# Patient Record
Sex: MALE | Race: WHITE | NOT HISPANIC OR LATINO | Employment: UNEMPLOYED | URBAN - METROPOLITAN AREA
[De-identification: names, ages, dates, MRNs, and addresses within clinical notes are randomized per-mention and may not be internally consistent; named-entity substitution may affect disease eponyms.]

---

## 2024-01-09 ENCOUNTER — APPOINTMENT (OUTPATIENT)
Dept: PRIMARY CARE | Facility: CLINIC | Age: 24
End: 2024-01-09
Payer: MEDICAID

## 2025-05-12 ENCOUNTER — APPOINTMENT (OUTPATIENT)
Dept: PRIMARY CARE | Facility: CLINIC | Age: 25
End: 2025-05-12
Payer: MEDICAID

## 2025-06-09 ENCOUNTER — APPOINTMENT (OUTPATIENT)
Dept: PRIMARY CARE | Facility: CLINIC | Age: 25
End: 2025-06-09
Payer: COMMERCIAL

## 2025-06-23 ENCOUNTER — APPOINTMENT (OUTPATIENT)
Dept: PRIMARY CARE | Facility: CLINIC | Age: 25
End: 2025-06-23
Payer: COMMERCIAL

## 2025-07-07 ENCOUNTER — OFFICE VISIT (OUTPATIENT)
Dept: PRIMARY CARE | Facility: CLINIC | Age: 25
End: 2025-07-07
Payer: COMMERCIAL

## 2025-07-07 ENCOUNTER — CLINICAL SUPPORT (OUTPATIENT)
Dept: PRIMARY CARE | Facility: CLINIC | Age: 25
End: 2025-07-07
Payer: COMMERCIAL

## 2025-07-07 VITALS
BODY MASS INDEX: 42.66 KG/M2 | DIASTOLIC BLOOD PRESSURE: 82 MMHG | OXYGEN SATURATION: 98 % | WEIGHT: 315 LBS | HEART RATE: 82 BPM | TEMPERATURE: 96.9 F | SYSTOLIC BLOOD PRESSURE: 142 MMHG | RESPIRATION RATE: 18 BRPM | HEIGHT: 72 IN

## 2025-07-07 DIAGNOSIS — E66.9 OBESITY, UNSPECIFIED CLASS, UNSPECIFIED OBESITY TYPE, UNSPECIFIED WHETHER SERIOUS COMORBIDITY PRESENT: Primary | ICD-10-CM

## 2025-07-07 DIAGNOSIS — E66.9 OBESITY, UNSPECIFIED CLASS, UNSPECIFIED OBESITY TYPE, UNSPECIFIED WHETHER SERIOUS COMORBIDITY PRESENT: ICD-10-CM

## 2025-07-07 DIAGNOSIS — I10 PRIMARY HYPERTENSION: ICD-10-CM

## 2025-07-07 DIAGNOSIS — F20.0 PARANOID SCHIZOPHRENIA (MULTI): Chronic | ICD-10-CM

## 2025-07-07 DIAGNOSIS — F41.9 ANXIETY: ICD-10-CM

## 2025-07-07 PROBLEM — F32.A DEPRESSION: Status: ACTIVE | Noted: 2025-07-07

## 2025-07-07 PROBLEM — F43.10 POST TRAUMATIC STRESS DISORDER: Chronic | Status: ACTIVE | Noted: 2022-10-17

## 2025-07-07 PROBLEM — F95.9 TIC DISORDER, UNSPECIFIED: Chronic | Status: ACTIVE | Noted: 2022-10-17

## 2025-07-07 PROCEDURE — RXMED WILLOW AMBULATORY MEDICATION CHARGE

## 2025-07-07 PROCEDURE — 3008F BODY MASS INDEX DOCD: CPT | Performed by: FAMILY MEDICINE

## 2025-07-07 PROCEDURE — 3079F DIAST BP 80-89 MM HG: CPT | Performed by: FAMILY MEDICINE

## 2025-07-07 PROCEDURE — 3077F SYST BP >= 140 MM HG: CPT | Performed by: FAMILY MEDICINE

## 2025-07-07 PROCEDURE — 1036F TOBACCO NON-USER: CPT | Performed by: FAMILY MEDICINE

## 2025-07-07 PROCEDURE — 99213 OFFICE O/P EST LOW 20 MIN: CPT | Performed by: FAMILY MEDICINE

## 2025-07-07 RX ORDER — PRAZOSIN HYDROCHLORIDE 1 MG/1
1 CAPSULE ORAL NIGHTLY
COMMUNITY
Start: 2025-01-06 | End: 2025-07-07 | Stop reason: SDUPTHER

## 2025-07-07 RX ORDER — PRAZOSIN HYDROCHLORIDE 1 MG/1
1 CAPSULE ORAL NIGHTLY
Qty: 30 CAPSULE | Refills: 11 | Status: SHIPPED | OUTPATIENT
Start: 2025-07-07 | End: 2026-07-07

## 2025-07-07 RX ORDER — ARIPIPRAZOLE 15 MG/1
15 TABLET ORAL
COMMUNITY
Start: 2025-01-06 | End: 2025-07-07 | Stop reason: SDUPTHER

## 2025-07-07 RX ORDER — QUETIAPINE FUMARATE 25 MG/1
25 TABLET, FILM COATED ORAL 2 TIMES DAILY
Qty: 60 TABLET | Refills: 11 | Status: SHIPPED | OUTPATIENT
Start: 2025-07-07 | End: 2026-07-07

## 2025-07-07 RX ORDER — ARIPIPRAZOLE 15 MG/1
15 TABLET ORAL
Qty: 30 TABLET | Refills: 11 | Status: SHIPPED | OUTPATIENT
Start: 2025-07-07 | End: 2026-07-07

## 2025-07-07 RX ORDER — QUETIAPINE FUMARATE 25 MG/1
25 TABLET, FILM COATED ORAL 2 TIMES DAILY PRN
COMMUNITY
Start: 2025-01-06 | End: 2025-07-07 | Stop reason: SDUPTHER

## 2025-07-07 RX ORDER — TIRZEPATIDE 2.5 MG/.5ML
2.5 INJECTION, SOLUTION SUBCUTANEOUS
Qty: 2 ML | Refills: 0 | Status: SHIPPED | OUTPATIENT
Start: 2025-07-07

## 2025-07-07 ASSESSMENT — PATIENT HEALTH QUESTIONNAIRE - PHQ9
2. FEELING DOWN, DEPRESSED OR HOPELESS: NOT AT ALL
SUM OF ALL RESPONSES TO PHQ9 QUESTIONS 1 AND 2: 0
1. LITTLE INTEREST OR PLEASURE IN DOING THINGS: NOT AT ALL

## 2025-07-07 ASSESSMENT — PROMIS GLOBAL HEALTH SCALE
RATE_AVERAGE_FATIGUE: SEVERE
RATE_SOCIAL_SATISFACTION: POOR
RATE_GENERAL_HEALTH: FAIR
EMOTIONAL_PROBLEMS: ALWAYS
RATE_QUALITY_OF_LIFE: FAIR
RATE_PHYSICAL_HEALTH: FAIR
CARRYOUT_PHYSICAL_ACTIVITIES: MODERATELY
CARRYOUT_SOCIAL_ACTIVITIES: POOR
RATE_AVERAGE_PAIN: 5
RATE_MENTAL_HEALTH: POOR

## 2025-07-07 ASSESSMENT — PAIN SCALES - GENERAL: PAINLEVEL_OUTOF10: 0-NO PAIN

## 2025-07-07 NOTE — PROGRESS NOTES
Subjective   Patient ID: Jefferson Greene is a 24 y.o. male who presents for Annual Exam.    HPI he is not fasting     Review of Systems    Objective   /82   Pulse 82   Temp 36.1 °C (96.9 °F)   Resp 18   Ht 1.829 m (6')   Wt (!) 177 kg (390 lb 12.8 oz)   SpO2 98%   BMI 53.00 kg/m²     Physical Exam    Assessment/Plan   Problem List Items Addressed This Visit           ICD-10-CM    Anxiety F41.9     Other Visit Diagnoses         Codes      Obesity, unspecified class, unspecified obesity type, unspecified whether serious comorbidity present    -  Primary E66.9

## 2025-07-07 NOTE — PROGRESS NOTES
Patient received the following medication education on Zepbound:    - Counseled patient on MOA, expectations, side effects, duration of therapy, contraindications, administration, and monitoring parameters.  - Provided detailed dosing and administration counseling to ensure proper technique.   - Reviewed Zepbound titration schedule, starting with 2.5 mg once weekly to 5mg starting on the 5th week and up to 15mg if tolerated. Patient verbalized understanding  - Counseled patient on the benefits of GLP-1ra glycemic control and weight loss  - Reviewed storage requirements of Zepbound when not in use, and when to administer the medication if a dose is missed.  - Advised patient that they may experience improved satiety after meals and portion sizes of meals may be reduced as doses of Zepbound increase.    Medication education provided by SASHA Perez, PharmD, BCPS

## 2025-07-10 ENCOUNTER — PHARMACY VISIT (OUTPATIENT)
Dept: PHARMACY | Facility: CLINIC | Age: 25
End: 2025-07-10
Payer: COMMERCIAL

## 2025-07-10 PROCEDURE — RXOTC WILLOW AMBULATORY OTC CHARGE

## 2025-07-21 DIAGNOSIS — F20.0 PARANOID SCHIZOPHRENIA (MULTI): Chronic | ICD-10-CM

## 2025-07-21 DIAGNOSIS — I10 PRIMARY HYPERTENSION: ICD-10-CM

## 2025-07-21 RX ORDER — QUETIAPINE FUMARATE 25 MG/1
25 TABLET, FILM COATED ORAL 2 TIMES DAILY
Qty: 180 TABLET | Refills: 3 | Status: SHIPPED | OUTPATIENT
Start: 2025-07-21 | End: 2025-10-19

## 2025-07-21 RX ORDER — PRAZOSIN HYDROCHLORIDE 1 MG/1
1 CAPSULE ORAL NIGHTLY
Qty: 90 CAPSULE | Refills: 3 | Status: SHIPPED | OUTPATIENT
Start: 2025-07-21 | End: 2025-10-19

## 2025-07-21 RX ORDER — ARIPIPRAZOLE 15 MG/1
15 TABLET ORAL
Qty: 90 TABLET | Refills: 3 | Status: SHIPPED | OUTPATIENT
Start: 2025-07-21 | End: 2025-10-19

## 2025-08-08 PROCEDURE — RXMED WILLOW AMBULATORY MEDICATION CHARGE

## 2025-08-13 ENCOUNTER — PHARMACY VISIT (OUTPATIENT)
Dept: PHARMACY | Facility: CLINIC | Age: 25
End: 2025-08-13
Payer: MEDICARE